# Patient Record
Sex: FEMALE | Race: WHITE
[De-identification: names, ages, dates, MRNs, and addresses within clinical notes are randomized per-mention and may not be internally consistent; named-entity substitution may affect disease eponyms.]

---

## 2020-03-12 ENCOUNTER — HOSPITAL ENCOUNTER (EMERGENCY)
Dept: HOSPITAL 61 - ER | Age: 40
Discharge: HOME | End: 2020-03-12
Payer: MEDICAID

## 2020-03-12 VITALS — HEIGHT: 63 IN | BODY MASS INDEX: 26.6 KG/M2 | WEIGHT: 150.13 LBS

## 2020-03-12 VITALS — SYSTOLIC BLOOD PRESSURE: 123 MMHG | DIASTOLIC BLOOD PRESSURE: 72 MMHG

## 2020-03-12 DIAGNOSIS — R11.2: ICD-10-CM

## 2020-03-12 DIAGNOSIS — E11.9: ICD-10-CM

## 2020-03-12 DIAGNOSIS — R10.9: Primary | ICD-10-CM

## 2020-03-12 DIAGNOSIS — Z88.0: ICD-10-CM

## 2020-03-12 DIAGNOSIS — F17.200: ICD-10-CM

## 2020-03-12 DIAGNOSIS — E66.01: ICD-10-CM

## 2020-03-12 DIAGNOSIS — Z88.5: ICD-10-CM

## 2020-03-12 DIAGNOSIS — E03.9: ICD-10-CM

## 2020-03-12 LAB
ALBUMIN SERPL-MCNC: 2.9 G/DL (ref 3.4–5)
ALBUMIN/GLOB SERPL: 0.8 {RATIO} (ref 1–1.7)
ALP SERPL-CCNC: 78 U/L (ref 46–116)
ALT SERPL-CCNC: 9 U/L (ref 14–59)
ANION GAP SERPL CALC-SCNC: 9 MMOL/L (ref 6–14)
APTT BLD: 30 SEC (ref 24–38)
APTT PPP: YELLOW S
AST SERPL-CCNC: 16 U/L (ref 15–37)
BACTERIA #/AREA URNS HPF: (no result) /HPF
BASOPHILS # BLD AUTO: 0 X10^3/UL (ref 0–0.2)
BASOPHILS NFR BLD: 0 % (ref 0–3)
BILIRUB SERPL-MCNC: 0.5 MG/DL (ref 0.2–1)
BILIRUB UR QL STRIP: NEGATIVE
BUN SERPL-MCNC: 7 MG/DL (ref 7–20)
BUN/CREAT SERPL: 10 (ref 6–20)
CALCIUM SERPL-MCNC: 9.1 MG/DL (ref 8.5–10.1)
CHLORIDE SERPL-SCNC: 104 MMOL/L (ref 98–107)
CO2 SERPL-SCNC: 26 MMOL/L (ref 21–32)
CREAT SERPL-MCNC: 0.7 MG/DL (ref 0.6–1)
EOSINOPHIL NFR BLD: 0.1 X10^3/UL (ref 0–0.7)
EOSINOPHIL NFR BLD: 1 % (ref 0–3)
ERYTHROCYTE [DISTWIDTH] IN BLOOD BY AUTOMATED COUNT: 15.9 % (ref 11.5–14.5)
FIBRINOGEN PPP-MCNC: CLEAR MG/DL
GFR SERPLBLD BASED ON 1.73 SQ M-ARVRAT: 93.2 ML/MIN
GLOBULIN SER-MCNC: 3.6 G/DL (ref 2.2–3.8)
GLUCOSE SERPL-MCNC: 79 MG/DL (ref 70–99)
HCT VFR BLD CALC: 34.1 % (ref 36–47)
HGB BLD-MCNC: 11.3 G/DL (ref 12–15.5)
HYALINE CASTS #/AREA URNS LPF: (no result) /HPF
LIPASE: 110 U/L (ref 73–393)
LYMPHOCYTES # BLD: 1.4 X10^3/UL (ref 1–4.8)
LYMPHOCYTES NFR BLD AUTO: 30 % (ref 24–48)
MCH RBC QN AUTO: 35 PG (ref 25–35)
MCHC RBC AUTO-ENTMCNC: 33 G/DL (ref 31–37)
MCV RBC AUTO: 105 FL (ref 79–100)
MONO #: 0.2 X10^3/UL (ref 0–1.1)
MONOCYTES NFR BLD: 4 % (ref 0–9)
NEUT #: 2.8 X10^3/UL (ref 1.8–7.7)
NEUTROPHILS NFR BLD AUTO: 64 % (ref 31–73)
NITRITE UR QL STRIP: NEGATIVE
PH UR STRIP: 6 [PH]
PLATELET # BLD AUTO: 134 X10^3/UL (ref 140–400)
POTASSIUM SERPL-SCNC: 3.7 MMOL/L (ref 3.5–5.1)
PROT SERPL-MCNC: 6.5 G/DL (ref 6.4–8.2)
PROT UR STRIP-MCNC: NEGATIVE MG/DL
PROTHROMBIN TIME: 14.1 SEC (ref 11.7–14)
RBC # BLD AUTO: 3.25 X10^6/UL (ref 3.5–5.4)
RBC #/AREA URNS HPF: (no result) /HPF (ref 0–2)
SODIUM SERPL-SCNC: 139 MMOL/L (ref 136–145)
SQUAMOUS #/AREA URNS LPF: (no result) /LPF
UROBILINOGEN UR-MCNC: 0.2 MG/DL
WBC # BLD AUTO: 4.4 X10^3/UL (ref 4–11)
WBC #/AREA URNS HPF: (no result) /HPF (ref 0–4)

## 2020-03-12 PROCEDURE — 83690 ASSAY OF LIPASE: CPT

## 2020-03-12 PROCEDURE — 80053 COMPREHEN METABOLIC PANEL: CPT

## 2020-03-12 PROCEDURE — 74022 RADEX COMPL AQT ABD SERIES: CPT

## 2020-03-12 PROCEDURE — 36415 COLL VENOUS BLD VENIPUNCTURE: CPT

## 2020-03-12 PROCEDURE — 85610 PROTHROMBIN TIME: CPT

## 2020-03-12 PROCEDURE — 81001 URINALYSIS AUTO W/SCOPE: CPT

## 2020-03-12 PROCEDURE — 81025 URINE PREGNANCY TEST: CPT

## 2020-03-12 PROCEDURE — 99284 EMERGENCY DEPT VISIT MOD MDM: CPT

## 2020-03-12 PROCEDURE — 85730 THROMBOPLASTIN TIME PARTIAL: CPT

## 2020-03-12 PROCEDURE — 87086 URINE CULTURE/COLONY COUNT: CPT

## 2020-03-12 PROCEDURE — 85025 COMPLETE CBC W/AUTO DIFF WBC: CPT

## 2020-03-12 NOTE — PHYS DOC
Past Medical History


Past Medical History:  Diabetes-Type II, Hypothyroid, Other


Additional Past Medical Histor:  sciaticia, morbid obesity, ESOPHAGEAL STRICTURE


Past Surgical History:  , Other


Additional Past Surgical Histo:  GASTRIC SLEEVE, ESOPHAGEAL STRETCHING


Smoking Status:  Current Every Day Smoker


Alcohol Use:  None


Drug Use:  None





Adult General


Chief Complaint


Chief Complaint:  ABDOMINAL PAIN





HPI


HPI





Patient is a 39  year old female who presented to ER today for evaluation 

abdominal pain, nausea and vomiting.  Patient had this problem off and on for 8 

months.  Patient had gastric sleeve 8 months ago, after the procedure she has 

been having the same problem since.  Patient has been evaluated by her doctor 

multiple times, somehow today she decided to come to [facility] 

for evaluation.  Patient denies any fever, no vomiting blood, no diarrhea.  

Patient IS vomiting bilious content. Patient said she lost 150 lbs already since

her surgery 8 months ago.





Review of Systems


Review of Systems





Constitutional: Denies fever or chills []


Eyes: Denies change in visual acuity, redness, or eye pain []


HENT: Denies nasal congestion or sore throat []


Respiratory: Denies cough or shortness of breath []


Cardiovascular: No additional information not addressed in HPI []


GI: POSITIVE FOR abdominal pain, nausea, vomiting, NO bloody stools or diarrhea 

[]


: Denies dysuria or hematuria []


Musculoskeletal: Denies back pain or joint pain []


Integument: Denies rash or skin lesions []


Neurologic: Denies headache, focal weakness or sensory changes []


Endocrine: Denies polyuria or polydipsia []





All other systems were reviewed and found to be within normal limits, except as 

documented in this note.





Current Medications


Current Medications





Current Medications








 Medications


  (Trade)  Dose


 Ordered  Sig/Prudencio  Start Time


 Stop Time Status Last Admin


Dose Admin


 


 Metoclopramide HCl


  (Reglan Vial)  10 mg  1X  ONCE  3/12/20 13:15


 3/12/20 13:16 DC  














Allergies


Allergies





Allergies








Coded Allergies Type Severity Reaction Last Updated Verified


 


  Penicillins Allergy Intermediate  10/24/14 No


 


  codeine Allergy Unknown  2/3/16 Yes











Physical Exam


Physical Exam





Constitutional: Well developed, well nourished, no acute distress, non-toxic 

appearance. []


HENT: Normocephalic, atraumatic, bilateral external ears normal, oropharynx 

moist, no oral exudates, nose normal. []


Eyes: PERRLA, EOMI, conjunctiva PALE BILATERALLY no discharge. [] 


Neck: Normal range of motion, no tenderness, supple, no stridor. [] 


Cardiovascular:Heart rate regular rhythm, no murmur []


Lungs & Thorax:  Bilateral breath sounds clear to auscultation []


Abdomen: Bowel sounds normal, soft, no tenderness, no masses, no pulsatile 

masses. [] 


Skin: Warm, dry, no erythema, no rash. [] 


Back: No tenderness, no CVA tenderness. [] 


Extremities: No tenderness, no cyanosis, no clubbing, ROM intact, no edema. [] 


Neurologic: Alert and oriented X 3, normal motor function, normal sensory 

function, no focal deficits noted. []


Psychologic: Affect normal, judgement normal, mood normal. []





Current Patient Data


Vital Signs





                                   Vital Signs








  Date Time  Temp Pulse Resp B/P (MAP) Pulse Ox O2 Delivery O2 Flow Rate FiO2


 


3/12/20 13:03 97.7 70 18 123/72 (89) 100 Room Air  





 97.7       








Lab Values





                                Laboratory Tests








Test


 3/12/20


12:38 3/12/20


12:41 3/12/20


13:00


 


Urine Collection Type Unknown    


 


Urine Color Yellow    


 


Urine Clarity Clear    


 


Urine pH


 6.0 (<5.0-8.0)


 


 





 


Urine Specific Gravity


 1.020


(1.000-1.030) 


 





 


Urine Protein


 Negative mg/dL


(NEG-TRACE) 


 





 


Urine Glucose (UA)


 Negative mg/dL


(NEG) 


 





 


Urine Ketones (Stick)


 Negative mg/dL


(NEG) 


 





 


Urine Blood


 Negative (NEG)


 


 





 


Urine Nitrite


 Negative (NEG)


 


 





 


Urine Bilirubin


 Negative (NEG)


 


 





 


Urine Urobilinogen Dipstick


 0.2 mg/dL (0.2


mg/dL) 


 





 


Urine Leukocyte Esterase Small (NEG)    


 


Urine RBC


 Rare /HPF


(0-2) 


 





 


Urine WBC


 1-4 /HPF (0-4)


 


 





 


Urine Squamous Epithelial


Cells Few /LPF  


 


 





 


Urine Bacteria


 Few /HPF


(0-FEW) 


 





 


Urine Hyaline Casts Moderate /HPF    


 


Urine Mucus Mod /LPF    


 


POC Urine HCG, Qualitative


 


 Hcg negative


(Negative) 





 


White Blood Count


 


 


 4.4 x10^3/uL


(4.0-11.0)


 


Red Blood Count


 


 


 3.25 x10^6/uL


(3.50-5.40)  L


 


Hemoglobin


 


 


 11.3 g/dL


(12.0-15.5)  L


 


Hematocrit


 


 


 34.1 %


(36.0-47.0)  L


 


Mean Corpuscular Volume


 


 


 105 fL


()  H


 


Mean Corpuscular Hemoglobin   35 pg (25-35)  


 


Mean Corpuscular Hemoglobin


Concent 


 


 33 g/dL


(31-37)


 


Red Cell Distribution Width


 


 


 15.9 %


(11.5-14.5)  H


 


Platelet Count


 


 


 134 x10^3/uL


(140-400)  L


 


Neutrophils (%) (Auto)   64 % (31-73)  


 


Lymphocytes (%) (Auto)   30 % (24-48)  


 


Monocytes (%) (Auto)   4 % (0-9)  


 


Eosinophils (%) (Auto)   1 % (0-3)  


 


Basophils (%) (Auto)   0 % (0-3)  


 


Neutrophils # (Auto)


 


 


 2.8 x10^3/uL


(1.8-7.7)


 


Lymphocytes # (Auto)


 


 


 1.4 x10^3/uL


(1.0-4.8)


 


Monocytes # (Auto)


 


 


 0.2 x10^3/uL


(0.0-1.1)


 


Eosinophils # (Auto)


 


 


 0.1 x10^3/uL


(0.0-0.7)


 


Basophils # (Auto)


 


 


 0.0 x10^3/uL


(0.0-0.2)


 


Prothrombin Time


 


 


 14.1 SEC


(11.7-14.0)  H


 


Prothrombin Time INR   1.1 (0.8-1.1)  


 


Activated Partial


Thromboplast Time 


 


 30 SEC (24-38)





 


Sodium Level


 


 


 139 mmol/L


(136-145)


 


Potassium Level


 


 


 3.7 mmol/L


(3.5-5.1)


 


Chloride Level


 


 


 104 mmol/L


()


 


Carbon Dioxide Level


 


 


 26 mmol/L


(21-32)


 


Anion Gap   9 (6-14)  


 


Blood Urea Nitrogen


 


 


 7 mg/dL (7-20)





 


Creatinine


 


 


 0.7 mg/dL


(0.6-1.0)


 


Estimated GFR


(Cockcroft-Gault) 


 


 93.2  





 


BUN/Creatinine Ratio   10 (6-20)  


 


Glucose Level


 


 


 79 mg/dL


(70-99)


 


Calcium Level


 


 


 9.1 mg/dL


(8.5-10.1)


 


Total Bilirubin


 


 


 0.5 mg/dL


(0.2-1.0)


 


Aspartate Amino Transferase


(AST) 


 


 16 U/L (15-37)





 


Alanine Aminotransferase (ALT)


 


 


 9 U/L (14-59)


L


 


Alkaline Phosphatase


 


 


 78 U/L


()


 


Total Protein


 


 


 6.5 g/dL


(6.4-8.2)


 


Albumin


 


 


 2.9 g/dL


(3.4-5.0)  L


 


Albumin/Globulin Ratio


 


 


 0.8 (1.0-1.7)


L


 


Lipase


 


 


 110 U/L


()





                                Laboratory Tests


3/12/20 13:00








                                Laboratory Tests


3/12/20 13:00














EKG


EKG


[]





Radiology/Procedures


Radiology/Procedures


[][facility]


                    [address] [place] [address]


                                 [phone]


                                        


                                 IMAGING REPORT





                                     Signed





PATIENT: MUSTAPHA WHEATLEY EACCOUNT: [id number]     MRN#: [id number]


: 1980           LOCATION: ER              AGE: 39


SEX: F                    EXAM DT: 20         ACCESSION#: [id number]


STATUS: REG ER            ORD. PHYSICIAN: CHAPARRO CONKLIN DO


REASON: ABDOMINAL PAIN,PT STATES MID ABD. PAIN X 8 MONTHS, VOMITING


PROCEDURE: ACUTE ABDOMEN SERIES





ACUTE ABDOMEN SERIES


 


History: Abdominal pain for 8 months, vomiting


 


Comparison: Chest radiograph 2013


 


Findings:


Single view of the chest, single upright view of the abdomen, and single 


supine AP view of the abdomen are submitted. There is no lobar infiltrate,


pleural fluid, pneumothorax, or free air. There has been cholecystectomy. 


There is some variable retained stool greater of the right colon. No 


significant gas dilated small bowel is identified. There is surgical 


suture in the left upper quadrant of the abdomen.


 


Impression: 


 


1.  No significant gas dilated small bowel is identified. There is some 


variable retained stool in the colon.


 


Electronically signed by: Summer Bernstein MD (3/12/2020 1:56 PM) [id number]














DICTATED and SIGNED BY:     SUMMER BERNSTEIN MD


DATE:     20 1356





Course & Med Decision Making


Course & Med Decision Making


Pertinent Labs and Imaging studies reviewed. (See chart for details)





[Patient is a 39-year-old male who was evaluated in the ER due to abdominal pain

 after she had her gastric sleeve procedure done 8 months ago.  This is chronic 

abdominal pain, SHE WILL NEED TO FOLLOW UP  with  HER surgeon for further 

evaluation and treatment.





Dragon Disclaimer


Dragon Disclaimer


This electronic medical record was generated, in whole or in part, using a voice

 recognition dictation system.





Departure


Departure


Impression:  


   Primary Impression:  


   Abdominal pain


Disposition:   HOME, SELF-CARE


Condition:  STABLE


Referrals:  


NO PCP (PCP)


FOLLOW UP WITH YOUR DOCTOR NEXT WEEK


Patient Instructions:  Abdominal Pain











CHAPARRO CONKLIN DO                Mar 12, 2020 13:23

## 2020-03-12 NOTE — RAD
ACUTE ABDOMEN SERIES

 

History: Abdominal pain for 8 months, vomiting

 

Comparison: Chest radiograph January 30, 2013

 

Findings:

Single view of the chest, single upright view of the abdomen, and single 

supine AP view of the abdomen are submitted. There is no lobar infiltrate,

pleural fluid, pneumothorax, or free air. There has been cholecystectomy. 

There is some variable retained stool greater of the right colon. No 

significant gas dilated small bowel is identified. There is surgical 

suture in the left upper quadrant of the abdomen.

 

Impression: 

 

1.  No significant gas dilated small bowel is identified. There is some 

variable retained stool in the colon.

 

Electronically signed by: Vivek Raya MD (3/12/2020 1:56 PM) [id number]

## 2021-10-13 ENCOUNTER — HOSPITAL ENCOUNTER (EMERGENCY)
Dept: HOSPITAL 61 - ER | Age: 41
Discharge: HOME | End: 2021-10-13
Payer: MEDICAID

## 2021-10-13 VITALS — WEIGHT: 176.15 LBS | BODY MASS INDEX: 31.21 KG/M2 | HEIGHT: 63 IN

## 2021-10-13 VITALS — DIASTOLIC BLOOD PRESSURE: 88 MMHG | SYSTOLIC BLOOD PRESSURE: 128 MMHG
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
## 2021-10-13 DIAGNOSIS — F17.200: ICD-10-CM

## 2021-10-13 DIAGNOSIS — Z88.5: ICD-10-CM
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
## 2021-10-13 DIAGNOSIS — Z88.0: ICD-10-CM

## 2021-10-13 DIAGNOSIS — Z98.84: ICD-10-CM
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
## 2021-10-13 DIAGNOSIS — S43.402A: Primary | ICD-10-CM

## 2021-10-13 PROCEDURE — 73030 X-RAY EXAM OF SHOULDER: CPT

## 2021-10-13 PROCEDURE — 99283 EMERGENCY DEPT VISIT LOW MDM: CPT

## 2021-10-13 NOTE — PHYS DOC
Past Medical History


Past Medical History:  Cancer


Additional Past Medical Histor:  Esophageal Ca


Past Surgical History:  Cholecystectomy, 


Additional Past Surgical Histo:  gastric bypass


Smoking Status:  Current Every Day Smoker


Alcohol Use:  None


Drug Use:  None





General Adult


EDM:


Chief Complaint:  left shoulder injury





HPI:


HPI:





Patient is a 40  year old female who presented to the ER for evaluation of left 

shoulder injury.  Patient said she was helping her family moving an air 

conditioner this morning.  It slipped and pulled her left shoulder down.  She 

has have pain in her left shoulder since.  Patient did not fall down, did not 

hit her left shoulder on anything.  Patient denies any weakness or numbness in 

her left upper extremity.





Review of Systems:


Review of Systems:


Constitutional:   Denies fever or chills. []


Eyes:   Denies change in visual acuity. []


HENT:   Denies nasal congestion or sore throat. [] 


Respiratory:   Denies cough or shortness of breath. [] 


Cardiovascular:   Denies chest pain or edema. [] 


GI:   Denies abdominal pain, nausea, vomiting, bloody stools or diarrhea. [] 


:  Denies dysuria. [] 


Musculoskeletal:   Positive for left shoulder pain


Integument:   Denies rash. [] 


Neurologic:   Denies headache, focal weakness or sensory changes. [] 


Endocrine:   Denies polyuria or polydipsia. [] 


Lymphatic:  Denies swollen glands. [] 


Psychiatric:  Denies depression or anxiety. []





Heart Score:


C/O Chest Pain:  N/A


Risk Factors:


Risk Factors:  DM, Current or recent (<one month) smoker, HTN, HLP, family 

history of CAD, obesity.


Risk Scores:


Score 0 - 3:  2.5% MACE over next 6 weeks - Discharge Home


Score 4 - 6:  20.3% MACE over next 6 weeks - Admit for Clinical Observation


Score 7 - 10:  72.7% MACE over next 6 weeks - Early Invasive Strategies





Current Medications:





Current Medications








 Medications


  (Trade)  Dose


 Ordered  Sig/Prudencio  Start Time


 Stop Time Status Last Admin


Dose Admin


 


 Ketorolac


 Tromethamine


  (Toradol Im)  60 mg  1X  ONCE  10/13/21 16:45


 10/13/21 16:46 DC  














Allergies:


Allergies:





Allergies








Coded Allergies Type Severity Reaction Last Updated Verified


 


  Penicillins Allergy Severe anaphylaxis 10/13/21 No


 


  codeine Adverse Reaction Mild itching 10/13/21 Yes











Physical Exam:


PE:





Constitutional: Well developed, well nourished, no acute distress, non-toxic 

appearance. []


HENT: Normocephalic, atraumatic, bilateral external ears normal, oropharynx 

moist, no oral exudates, nose normal. []


Eyes: PERRLA, EOMI, conjunctiva normal, no discharge. [] 


Neck: Normal range of motion, no tenderness, supple, no stridor. [] 


Cardiovascular:Heart rate regular rhythm, no murmur []


Lungs & Thorax:  Bilateral breath sounds clear to auscultation []





Skin: Warm, dry, no erythema, no rash. [] 


Back: No tenderness, no CVA tenderness. [] 


Extremities: Left shoulder is tender to palpation, there is no deformity, there 

is full range of motion of left shoulder, no swelling, no contusion noted.  

Patient can  move all of her left fingers without any problem. 


Neurologic: Alert and oriented X 3, normal motor function, normal sensory 

function, no focal deficits noted. []


Psychologic: Affect normal, judgement normal, mood normal. []





Current Patient Data:


Vital Signs:





                                   Vital Signs








  Date Time  Temp Pulse Resp B/P (MAP) Pulse Ox O2 Delivery O2 Flow Rate FiO2


 


10/13/21 16:10 98.7 72 20 128/88 (101) 100 Room Air  





 98.7       











EKG:


EKG:


[]





Radiology/Procedures:


Radiology/Procedures:


[][facility]


                    [address] [place] [address]


                                 [phone]


                                        


                                 IMAGING REPORT





                                     Signed





PATIENT: MUSTAPHA WHEATLEY EACCOUNT: [id number]     MRN#: [id number]


: 1980           LOCATION: ER              AGE: 40


SEX: F                    EXAM DT: 10/13/21         ACCESSION#: [id number]


STATUS: REG ER            ORD. PHYSICIAN: CHAPARRO CONKLIN DO


REASON: left shoulder injury


PROCEDURE: SHOULDER 2+V LEFT





XR SHOULDER_LEFT 2+ VIEWS





Clinical indications: Reason: left shoulder injury / Spl. Instructions:  / 

History: 





Findings:  No acute fracture or dislocation or osteolytic process is evident. No

 AC joint separation is seen.





IMPRESSION: No acute osseous abnormality is evident.





Electronically signed by: Elle Barreto MD (10/13/2021 4:51 PM) [id number]














DICTATED and SIGNED BY:     ELLE BARRETO MD


DATE:     10/13/21 [id number]





Course & Med Decision Making:


Course & Med Decision Making


Pertinent Labs and Imaging studies reviewed. (See chart for details)





Patient is a 40-year-old female who present to ER for evaluation of left 

shoulder injury.  X-ray her left shoulder did not show any acute problem.  

Patient was given a shot of Toradol in ER for pain control.  Patient  will be 

discharged home, she will need to follow-up with her family physician for o

utpatient evaluation with MRI of left shoulder if she continues to have pain.





Dragon Disclaimer:


Dragon Disclaimer:


This electronic medical record was generated, in whole or in part, using a voice

 recognition dictation system.





Departure


Departure


Impression:  


   Primary Impression:  


   Sprain of left shoulder


Disposition:   HOME / SELF CARE / HOMELESS


Condition:  STABLE


Referrals:  


NO PCP (PCP)


Please follow up with [facility] this week.





[address], [address]


[place] [address]





Phone number: [phone]


Patient Instructions:  Shoulder Sprain





Additional Instructions:  


Thank you for visiting our Emergency Department. We appreciate you trusting us 

with your care. If any additional problems come up don't hesitate to return to 

visit us. Please follow up with your primary care provider so they can plan 

additional care if needed and know about the problem that you had. If symptoms 

worsen come back to the Emergency Department. Any concerning symptoms that start

 such as chest pain, shortness of air, weakness or numbness on one side of the 

body, running high fevers or any other concerning symptoms return to the ER.


Scripts


Tramadol Hcl (TRAMADOL HCL) 50 Mg Tablet


50 MG PO Q6HRS PRN for PAIN, #15 TAB


   Prov: CHAPARRO CONKLIN DO         10/13/21 


Naproxen Sodium (ANAPROX DS) 550 Mg Tablet


1 TAB PO BID PRN for PAIN for 15 Days, #30 TAB 0 Refills


   Prov: CHAPARRO CONKLIN DO         10/13/21











CHAPARRO CONKLIN DO                Oct 13, 2021 16:53

## 2021-10-13 NOTE — RAD
XR SHOULDER_LEFT 2+ VIEWS



Clinical indications: Reason: left shoulder injury / Spl. Instructions:  / History: 



Findings:  No acute fracture or dislocation or osteolytic process is evident. No AC joint separation 
is seen.



IMPRESSION: No acute osseous abnormality is evident.



Electronically signed by: Andrei Barreto MD (10/13/2021 4:51 PM) [id number]